# Patient Record
Sex: FEMALE | Race: OTHER | Employment: UNEMPLOYED | URBAN - METROPOLITAN AREA
[De-identification: names, ages, dates, MRNs, and addresses within clinical notes are randomized per-mention and may not be internally consistent; named-entity substitution may affect disease eponyms.]

---

## 2020-10-16 ENCOUNTER — TRANSCRIBE ORDER (OUTPATIENT)
Dept: FAMILY MEDICINE CLINIC | Age: 25
End: 2020-10-16

## 2020-10-16 ENCOUNTER — TELEPHONE (OUTPATIENT)
Dept: FAMILY MEDICINE CLINIC | Age: 25
End: 2020-10-16

## 2020-10-16 NOTE — TELEPHONE ENCOUNTER
Spoke with  who was given the [facility] office, ph  [phone]. She will keep the scheduled VV appt of this office and if does not need, will call to cancel.

## 2020-10-16 NOTE — TELEPHONE ENCOUNTER
n/a   Received: Yesterday   Message Contents   Stanislav Webb Front Office               Appointment not available     Caller's first and last name and relationship to patient (if not the patient): n/a       Best contact number: [phone]       Preferred date and time: as soon as possible       Scheduled appointment date and time: n/a       Reason for appointment: np est pcp; pregnacy test       Details to clarify the request: n/a       Laura Mendoza

## 2020-10-16 NOTE — TELEPHONE ENCOUNTER
----- Message from [place] sent at 10/15/2020  6:26 PM EDT -----  Regarding: /Telephone  General Message/Vendor Calls    Caller's first and last name:      Reason for call: Pt was schedule for an virtual appointment and she really needs a pregnancy test.       Callback required yes/no and why:Y       Best contact number(s): [phone]      Details to clarify the request:      Jarred Peña

## 2020-10-26 ENCOUNTER — VIRTUAL VISIT (OUTPATIENT)
Dept: FAMILY MEDICINE CLINIC | Age: 25
End: 2020-10-26

## 2020-10-26 ENCOUNTER — TELEPHONE (OUTPATIENT)
Dept: FAMILY MEDICINE CLINIC | Age: 25
End: 2020-10-26

## 2020-10-26 NOTE — PROGRESS NOTES
9:21 AM    Patient failed to check in. [address] Rosal[address] Sarah  assisted with this encounter  d/t language barrier. Called the number listed on chart. Voice mail is not set up, patient did not  the phone. Will route the note to clinic support staff to assist with rescheduling.      Silverio Powell MD

## 2020-11-02 ENCOUNTER — VIRTUAL VISIT (OUTPATIENT)
Dept: FAMILY MEDICINE CLINIC | Age: 25
End: 2020-11-02

## 2020-11-02 DIAGNOSIS — N92.6 MISSED MENSES: Primary | ICD-10-CM

## 2020-11-02 DIAGNOSIS — O21.9 NAUSEA AND VOMITING IN PREGNANCY: ICD-10-CM

## 2020-11-02 PROCEDURE — 99214 OFFICE O/P EST MOD 30 MIN: CPT | Performed by: STUDENT IN AN ORGANIZED HEALTH CARE EDUCATION/TRAINING PROGRAM

## 2020-11-02 PROCEDURE — 81025 URINE PREGNANCY TEST: CPT | Performed by: STUDENT IN AN ORGANIZED HEALTH CARE EDUCATION/TRAINING PROGRAM

## 2020-11-02 RX ORDER — PYRIDOXINE HCL (VITAMIN B6) 25 MG
25 TABLET ORAL
Qty: 90 TAB | Refills: 3 | Status: SHIPPED | OUTPATIENT
Start: 2020-11-02 | End: 2020-11-25

## 2020-11-02 RX ORDER — PRENATAL VIT 96/IRON FUM/FOLIC 27MG-0.8MG
1 TABLET ORAL DAILY
Qty: 90 TAB | Refills: 5 | Status: SHIPPED | OUTPATIENT
Start: 2020-11-02

## 2020-11-02 NOTE — PROGRESS NOTES
Alina Woodson  22 y.o. female  1995  [address] [facility] [place]:    Telemedicine Progress Note  Tony Love MD       Encounter Date and Time: 2020 at 3:18 PM    Consent:  She and/or the health care decision maker is aware that that she may receive a bill for this telephone service, depending on her insurance coverage, and has provided verbal consent to proceed: Yes     : [id number]    Chief Complaint   Patient presents with    Missed Menses     History of Present Illness   Alina Woodson is a 22 y.o. female was evaluated by synchronous (real-time) audio-video technology from home, through the [employer] Patient Portal.    Pt is a ; she reports missed menses (LMP: 20); regular monthly cycle, no contraceptives. She is currently at [address] (LEONID: 8w5d) based on her LMP. Pt took 3x +UPT at home. Surprise pregnancy but is pleased about it. Not taking any prenatals. Endorses nausea/vomiting ~3-4x/day. No vaginal bleeding. Review of Systems   Review of Systems   Constitutional: Negative for chills and fever. Respiratory: Negative for cough and shortness of breath. Cardiovascular: Negative for chest pain and palpitations. Gastrointestinal: Positive for nausea and vomiting. Negative for abdominal pain, constipation and diarrhea. Vitals/Objective:     General: alert, cooperative, no distress   Mental  status: mental status: alert, oriented to person, place, and time, normal mood, behavior, speech, dress, motor activity, and thought processes   Resp: resp: normal effort and no respiratory distress   Neuro: neuro: no gross deficits   Skin: skin: no discoloration or lesions of concern on visible areas   Due to this being a TeleHealth evaluation, many elements of the physical examination are unable to be assessed.       Assessment and Plan:   Time-based coding, delete if not needed: I spent at least 15 minutes with this established patient, and >50% of the time was spent counseling and/or coordinating care regarding missed menses    A/P:  1. Missed menses w/ N/V  8w5d by LMP (9/2/20); LEONID 6/9/21  - prenatal UILO97/ZGUP fum/folic (prenatal vitamin) 27 mg iron- 800 mcg tab tablet; Take 1 Tab by mouth daily. Dispense: 90 Tab; Refill: 5  - pyridoxine, vitamin B6, (VITAMIN B-6) 25 mg tablet; Take 1 Tab by mouth every six (6) hours as needed for Nausea. Dispense: 90 Tab; Refill: 3  - doxylamine succinate (UNISOM) 25 mg tablet; Take 1 Tab by mouth every six (6) hours as needed for Nausea or Vomiting. Dispense: 90 Tab; Refill: 3  - AMB POC URINE PREGNANCY TEST, VISUAL COLOR COMPARISON  - IOB visit by 12w GA ~11/25/21      Time spent in direct conversation with the patient to include medical condition(s) discussed, assessment and treatment plan:       We discussed the expected course, resolution and complications of the diagnosis(es) in detail. Medication risks, benefits, costs, interactions, and alternatives were discussed as indicated. I advised her to contact the office if her condition worsens, changes or fails to improve as anticipated. She expressed understanding with the diagnosis(es) and plan. Patient understands that this encounter was a temporary measure, and the importance of further follow up and examination was emphasized. Patient verbalized understanding. Patient informed to follow up: Follow-up and Dispositions  ·   Return for Lab visit this week for UPT to confirm pregnancy. IOB visit ~11/25; COVID screen neg. Pt discussed w/ Dr. Anner Scheuermann, Family Medicine Attending    Electronically Signed: Lizet Priest MD, Family Medicine Resident    Providers location when delivering service (clinic, hospital, home): home    CPT Codes 77954-96884 for Established Patients may apply to this Telehealth Visit. POS code: 18.   Modifier GT      Pursuant to the emergency declaration under the [address] and the [address] authority and the Coronavirus Preparedness and [employer] Act, this Virtual  Visit was conducted, with patient's consent, to reduce the patient's risk of exposure to COVID-19 and provide continuity of care for an established patient. Services were provided through a video synchronous discussion virtually to substitute for in-person clinic visit. History   Patients past medical, surgical and family histories were reviewed and updated. Past Medical History:   Diagnosis Date    Essential hypertension     with pregnancy    Miscarriage 2013    D&C for missed ab     Past Surgical History:   Procedure Laterality Date    HX DILATION AND CURETTAGE  2013     No family history on file.   Social History     Socioeconomic History    Marital status: SINGLE     Spouse name: Not on file    Number of children: Not on file    Years of education: Not on file    Highest education level: Not on file   Occupational History    Not on file   Social Needs    Financial resource strain: Not on file    Food insecurity     Worry: Not on file     Inability: Not on file    Transportation needs     Medical: Not on file     Non-medical: Not on file   Tobacco Use    Smoking status: Never Smoker    Smokeless tobacco: Never Used   Substance and Sexual Activity    Alcohol use: No    Drug use: No    Sexual activity: Yes     Partners: Female   Lifestyle    Physical activity     Days per week: Not on file     Minutes per session: Not on file    Stress: Not on file   Relationships    Social connections     Talks on phone: Not on file     Gets together: Not on file     Attends Cheondoism service: Not on file     Active member of club or organization: Not on file     Attends meetings of clubs or organizations: Not on file     Relationship status: Not on file    Intimate partner violence     Fear of current or ex partner: Not on file     Emotionally abused: Not on file     Physically abused: Not on file     Forced sexual activity: Not on file   Other Topics Concern    Not on file   Social History Narrative    Not on file     Patient Active Problem List   Diagnosis Code    Obesity affecting pregnancy O99.210    Supervision of normal pregnancy Z34.90    UTI in pregnancy O23.40    Pregnancy Z34.90          Current Medications/Allergies   Medications and Allergies reviewed:    Current Outpatient Medications   Medication Sig Dispense Refill    prenatal IAXE62/HUKR fum/folic (prenatal vitamin) 27 mg iron- 800 mcg tab tablet Take 1 Tab by mouth daily. 90 Tab 5    pyridoxine, vitamin B6, (VITAMIN B-6) 25 mg tablet Take 1 Tab by mouth every six (6) hours as needed for Nausea. 90 Tab 3    doxylamine succinate (UNISOM) 25 mg tablet Take 1 Tab by mouth every six (6) hours as needed for Nausea or Vomiting. 90 Tab 3    ibuprofen (MOTRIN) 800 mg tablet Take 1 Tab by mouth every eight (8) hours as needed for Pain. Take 3x a day for first 3 days then as needed thereafter for pain but no more than 3x a day.  50 Tab 0     No Known Allergies

## 2020-11-04 ENCOUNTER — LAB ONLY (OUTPATIENT)
Dept: FAMILY MEDICINE CLINIC | Age: 25
End: 2020-11-04

## 2020-11-04 LAB
HCG URINE, QL. (POC): POSITIVE
VALID INTERNAL CONTROL?: YES

## 2020-11-06 NOTE — PROGRESS NOTES
Called patient with [facility]  and informed her of upcoming appointment. Patient verbalized understanding.
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
## 2020-11-25 ENCOUNTER — HOSPITAL ENCOUNTER (OUTPATIENT)
Dept: LAB | Age: 25
Discharge: HOME | End: 2020-11-25

## 2020-11-25 ENCOUNTER — INITIAL PRENATAL (OUTPATIENT)
Dept: FAMILY MEDICINE CLINIC | Age: 25
End: 2020-11-25

## 2020-11-25 VITALS
SYSTOLIC BLOOD PRESSURE: 119 MMHG | DIASTOLIC BLOOD PRESSURE: 83 MMHG | HEART RATE: 102 BPM | RESPIRATION RATE: 18 BRPM | BODY MASS INDEX: 29.23 KG/M2 | OXYGEN SATURATION: 99 % | HEIGHT: 59 IN | WEIGHT: 145 LBS | TEMPERATURE: 97 F

## 2020-11-25 DIAGNOSIS — Z3A.12 12 WEEKS GESTATION OF PREGNANCY: Primary | ICD-10-CM

## 2020-11-25 DIAGNOSIS — Z3A.12 12 WEEKS GESTATION OF PREGNANCY: ICD-10-CM

## 2020-11-25 DIAGNOSIS — O30.001 TWIN GESTATION IN FIRST TRIMESTER, UNSPECIFIED MULTIPLE GESTATION TYPE: ICD-10-CM

## 2020-11-25 DIAGNOSIS — Z87.59 HISTORY OF PRE-ECLAMPSIA: ICD-10-CM

## 2020-11-25 LAB
BILIRUB UR QL STRIP: NEGATIVE
BLOOD BANK CMNT PATIENT-IMP: NORMAL
BLOOD GROUP ANTIBODIES SERPL: NORMAL
GLUCOSE UR-MCNC: NEGATIVE MG/DL
KETONES P FAST UR STRIP-MCNC: NORMAL MG/DL
PH UR STRIP: 6 [PH] (ref 4.6–8)
PROT UR QL STRIP: NORMAL
SP GR UR STRIP: 1.02 (ref 1–1.03)
UA UROBILINOGEN AMB POC: NORMAL (ref 0.2–1)
URINALYSIS CLARITY POC: CLEAR
URINALYSIS COLOR POC: NORMAL
URINE BLOOD POC: NORMAL
URINE LEUKOCYTES POC: NEGATIVE
URINE NITRITES POC: NEGATIVE

## 2020-11-25 PROCEDURE — 88175 CYTOPATH C/V AUTO FLUID REDO: CPT

## 2020-11-25 PROCEDURE — 0502F SUBSEQUENT PRENATAL CARE: CPT | Performed by: STUDENT IN AN ORGANIZED HEALTH CARE EDUCATION/TRAINING PROGRAM

## 2020-11-25 PROCEDURE — 90686 IIV4 VACC NO PRSV 0.5 ML IM: CPT | Performed by: STUDENT IN AN ORGANIZED HEALTH CARE EDUCATION/TRAINING PROGRAM

## 2020-11-25 PROCEDURE — 87624 HPV HI-RISK TYP POOLED RSLT: CPT

## 2020-11-25 PROCEDURE — 90471 IMMUNIZATION ADMIN: CPT | Performed by: STUDENT IN AN ORGANIZED HEALTH CARE EDUCATION/TRAINING PROGRAM

## 2020-11-25 NOTE — PROGRESS NOTES
Identified Patient with two Patient identifiers (Name and ). Two Patient Identifiers confirmed. Reviewed record in preparation for visit and have obtained necessary documentation. Chief Complaint   Patient presents with    Initial Prenatal Visit     LMP: 2020 LEONID: 2021 12w0d [id number]       Visit Vitals  /83 (BP 1 Location: Right arm, BP Patient Position: Sitting)   Pulse (!) 113   Temp 97 °F (36.1 °C) (Temporal)   Resp 18   Ht 4' 11\" (1.499 m)   Wt 145 lb (65.8 kg)   SpO2 99%   Breastfeeding No   BMI 29.29 kg/m²       1. Have you been to the ER, urgent care clinic since your last visit? Hospitalized since your last visit? No    2. Have you seen or consulted any other health care providers outside of the [address] [place] [address] since your last visit? Include any pap smears or colon screening.  No

## 2020-11-25 NOTE — PATIENT INSTRUCTIONS
Semanas 10 a 14 de martin embarazo: Instrucciones de cuidado Weeks 10 to 14 of Your Pregnancy: Care Instructions Instrucciones de cuidado Para las semanas 10 a 14 de martin embarazo, la placenta se ha formado dentro del útero. Es posible oír los latidos del corazón de martin bebé con un instrumento especial de ultrasonido. Los ojos de martin bebé pueden moverse y lo Luceni. Los brazos y las piernas se pueden flexionar. Nimisha es un buen momento para pensar en las pruebas para detectar defectos congénitos. Existen dos tipos de pruebas: de detección y de diagnóstico. Las pruebas de detección muestran la posibilidad de que un bebé tenga un determinado defecto congénito. No pueden decirle con seguridad que martin bebé tiene un problema. Las pruebas de diagnóstico muestran si un bebé tiene un determinado defecto congénito. Es martin decisión si desea hacerse estas pruebas. Usted y martin yessy pueden hablar con martin médico o partera sobre las pruebas de defectos congénitos. La atención de seguimiento es natalia parte clave de martin tratamiento y seguridad. Asegúrese de hacer y acudir a todas las citas, y llame a martin médico si está teniendo problemas. También es natalia buena idea saber los resultados de maryuri exámenes y mantener natalia lista de los medicamentos que abilio. Cómo puede cuidarse en el hogar? Joshua Tam · Puede hacerse pruebas de detección y pruebas de diagnóstico para detectar defectos congénitos. La decisión de [employer] prueba para detectar defectos congénitos es personal. Considere martin edad, martin probabilidad de transmitir natalia enfermedad hereditaria, martin necesidad de saber acerca de cualquier problema y lo que podría hacer después de tener los [place] de la prueba. ? Análisis de amara triple o cuádruple. Estas pruebas de detección se pueden hacer entre las semanas 15 y 21 del [facility]. [address] cantidades de juan o cuatro sustancias en la amara.  El médico observa estos resultados de la prueba, junto con martin edad y otros factores, para averiguar la probabilidad de que martin bebé pueda tener ciertos problemas. ? Amniocentesis. Esta prueba de diagnóstico se utiliza para detectar problemas cromosómicos en las células del bebé. Se puede hacer [employer] 15 y 21 del [facility], por lo general alrededor de la semana 16. 
? Prueba de translucencia nucal. Esta prueba [place] ultrasonido para medir el grosor de la garrett de la nuca del bebé. Un aumento en el grosor puede ser natalia señal temprana del síndrome de Down. 
? Muestra de vellosidades coriónicas ([facility], por maryuri siglas en inglés). Esta es natalia prueba que detecta determinados problemas genéticos del bebé. Los mismos genes que tiene martin bebé están en la placenta. Se extrae y se examina un pequeño pedazo de placenta. Esta prueba se realiza cuando usted [employer] semana 10 y 15 de martin embarazo. Alivie la falta de comodidad · Cálmese y duerma siestas cuando se sienta cansada. · Si maryuri emociones varían, hable con alguien. El llanto, la ansiedad y los problemas de concentración son comunes. · Si le sangran las encías, pruebe usar un cepillo de dientes más [place]. Si tiene las [employer] o estas le sangran mucho, consulte con martin dentista. · Si tiene mareos: 
? Levántese despacio después de estar sentada o acostada. ? Iliana abundantes líquidos. ? Coma pequeños refrigerios para mantener estable el azúcar en amara. ? Coloque la Chung Vaishali maryuri piernas shakira si estuviera atándose los cordones (agujetas). ? Acuéstese con las piernas más arriba que martin grey. Use almohadas para apoyar los pies. · Si tiene dolor de grey: ? Acuéstese. ? Pídale a martin yessy o a un amigo que le keaton un masaje en el fabricio. ? Colóquese paños fríos sobre la frente o en la nuca. ? Use acetaminofén (Tylenol) para aliviar el dolor.  No tome antiinflamatorios no esteroideos (KEN), tales shakira ibuprofeno (Advil, Motrin) o naproxeno (Aleve), a menos que martin médico le diga que puede [place]. · Si le sangra la Dorette Avers, apriétesela con suavidad y manténgala apretada por un rato. Para evitar sangrados nasales, pruebe hacerse masajes en las fosas nasales con natalia cantidad pequeña de vaselina. · Si tiene la nariz congestionada, pruebe con un aerosol nasal salino (agua salada). No utilice aerosoles descongestionantes. Cuídese los senos · Use un sostén que le dé buen soporte. · Sepa que los cambios en los senos son normales. ? Rubina senos pueden aumentar de [employer] y [employer]. El aumento de la sensibilidad suele mejorar a las 12 semanas. ? Rubina pezones pueden oscurecerse y agrandarse, y es posible que las pequeñas protuberancias (abultamientos) alrededor de ellos annabelle más visibles. ? Las venas del pecho y de los senos podrían ser Donnise Usha. · No se preocupe por endurecer los pezones. El amamantamiento hará esto naturalmente. Dónde puede encontrar más información en inglés? Sherinia Campbell a http://www.gray.com/ Nicol X153 en la búsqueda para aprender más acerca de \"Semanas 10 a 14 de martin embarazo: Instrucciones de cuidado. \" Revisado: 11 febrero, 2020               Versión del contenido: 12.6 © 3076-5046 Healthwise, Incorporated. Las instrucciones de cuidado fueron adaptadas bajo licencia por [employer] (which disclaims liability or warranty for this information). Si usted tiene [place] afección médica o sobre estas instrucciones, siempre pregunte a matrin profesional de hanna. [employer] niega toda garantía o responsabilidad por martin uso de esta información.

## 2020-11-25 NOTE — PROGRESS NOTES
I reviewed with the resident the medical history and the resident's findings on the physical examination. I discussed with the resident the patient's diagnosis and concur with the plan. 26yo  @ 12w0d by LMP c/w 11 wk scan   1. Diamniotic twin IUP: did not see two placentas so will ask MFM to confirm chorionicity, RH pos, declined NIPT, s/p flu   2. VB: started on , has been on and off, described as \"light\", seen at [facility] where they did [address] and confirmed viable twin IUP, precautions given   3.   Hx PreE: will start ASA     Referred to [facility]

## 2020-11-27 LAB
BACTERIA SPEC CULT: NORMAL
SERVICE CMNT-IMP: NORMAL

## 2020-11-27 NOTE — PROGRESS NOTES
Antibody screen- neg, Rb immune, HBsAg titer- neg, HIV- neg, UCX NG, CBC hgb 12.3 & plt 292    TPAL, Chlamydia/Gonorrhea, VZ, PAP pending     O+ and Hgb frac wnl during previous pregnancies

## 2020-11-28 LAB — VZV IGG SER IA-ACNC: 2348 INDEX

## 2020-11-30 LAB
C TRACH DNA SPEC QL NAA+PROBE: NEGATIVE
ERYTHROCYTE [DISTWIDTH] IN BLOOD BY AUTOMATED COUNT: 11.8 % (ref 11.5–14.5)
HBV SURFACE AG SER QL: <0.1 INDEX
HBV SURFACE AG SER QL: NEGATIVE
HCT VFR BLD AUTO: 37 % (ref 35–47)
HGB BLD-MCNC: 12.3 G/DL (ref 11.5–16)
HIV 1+2 AB+HIV1 P24 AG SERPL QL IA: NONREACTIVE
HIV12 RESULT COMMENT, HHIVC: NORMAL
MCH RBC QN AUTO: 30.4 PG (ref 26–34)
MCHC RBC AUTO-ENTMCNC: 33.2 G/DL (ref 30–36.5)
MCV RBC AUTO: 91.4 FL (ref 80–99)
N GONORRHOEA DNA SPEC QL NAA+PROBE: NEGATIVE
NRBC # BLD: 0 K/UL (ref 0–0.01)
NRBC BLD-RTO: 0 PER 100 WBC
PLATELET # BLD AUTO: 292 K/UL (ref 150–400)
PMV BLD AUTO: 11.6 FL (ref 8.9–12.9)
RBC # BLD AUTO: 4.05 M/UL (ref 3.8–5.2)
RUBV IGG SER-IMP: REACTIVE
RUBV IGG SERPL IA-ACNC: 131.6 IU/ML
SAMPLE TYPE: NORMAL
SERVICE CMNT-IMP: NORMAL
SPECIMEN SOURCE: NORMAL
T PALLIDUM AB SER QL IA: NON REACTIVE
WBC # BLD AUTO: 9.4 K/UL (ref 3.6–11)

## 2020-12-05 NOTE — PROGRESS NOTES
PAP: NILM, HPV was checked by mistake and is positive. There are no set guidelines as this is not a routine check so can recheck PAP in year.

## 2020-12-11 ENCOUNTER — APPOINTMENT (OUTPATIENT)
Dept: FAMILY MEDICINE CLINIC | Age: 25
End: 2020-12-11

## 2020-12-11 ENCOUNTER — HOSPITAL ENCOUNTER (OUTPATIENT)
Dept: PERINATAL CARE | Age: 25
Discharge: HOME | End: 2020-12-11
Attending: OBSTETRICS & GYNECOLOGY

## 2020-12-11 PROCEDURE — 76815 OB US LIMITED FETUS(S): CPT | Performed by: OBSTETRICS & GYNECOLOGY

## 2020-12-11 PROCEDURE — 99204 OFFICE O/P NEW MOD 45 MIN: CPT | Performed by: OBSTETRICS & GYNECOLOGY

## 2020-12-18 ENCOUNTER — ROUTINE PRENATAL (OUTPATIENT)
Dept: FAMILY MEDICINE CLINIC | Age: 25
End: 2020-12-18

## 2020-12-18 VITALS
RESPIRATION RATE: 16 BRPM | WEIGHT: 151 LBS | SYSTOLIC BLOOD PRESSURE: 116 MMHG | HEART RATE: 110 BPM | OXYGEN SATURATION: 99 % | DIASTOLIC BLOOD PRESSURE: 76 MMHG | BODY MASS INDEX: 30.5 KG/M2

## 2020-12-18 DIAGNOSIS — Z87.59 HISTORY OF PRE-ECLAMPSIA: ICD-10-CM

## 2020-12-18 DIAGNOSIS — O30.039 MONOCHORIONIC DIAMNIOTIC TWIN PREGNANCY, ANTEPARTUM: Primary | ICD-10-CM

## 2020-12-18 LAB
BILIRUB UR QL STRIP: NEGATIVE
GLUCOSE UR-MCNC: NEGATIVE MG/DL
KETONES P FAST UR STRIP-MCNC: NEGATIVE MG/DL
PH UR STRIP: 6 [PH] (ref 4.6–8)
PROT UR QL STRIP: NEGATIVE
SP GR UR STRIP: 1 (ref 1–1.03)
UA UROBILINOGEN AMB POC: NORMAL (ref 0.2–1)
URINALYSIS CLARITY POC: CLEAR
URINALYSIS COLOR POC: YELLOW
URINE BLOOD POC: NORMAL
URINE LEUKOCYTES POC: NORMAL
URINE NITRITES POC: NEGATIVE

## 2020-12-18 PROCEDURE — 0502F SUBSEQUENT PRENATAL CARE: CPT | Performed by: FAMILY MEDICINE

## 2020-12-18 NOTE — PROGRESS NOTES
I reviewed with the resident the medical history and the resident's findings on the physical examination. I discussed with the resident the patient's diagnosis and concur with the plan. VB on and off, most recently 5 days ago, color is brown. 26yo  @ 15w2d by LMP c/w 11 wk scan   1. MCDA twins: RH pos, NIPT pending, s/p flu. Early concern for TTTS, however difficulty obtaining accurate AC measurement due to omphalocele. Met with genetic counselor, has f/up scan in 2 weeks. Today we discussed MCDA, TTTS and the abnormalities seen on US in detail and all questions were answered. I informed patient that depending on f/up scan she may need to transfer care to [facility] and she understands this. If she does not transfer will plan to keep [facility] aware of her progress and refer if pt desires (but sounds like her preference would be to stay at our clinic if possible.)  2. Spotting: on and off, subchorionic hemorrhage seen on scan   3.   Hx PreE: ASA     Referred to [facility]   Give Care Card application at next visit

## 2020-12-30 ENCOUNTER — HOSPITAL ENCOUNTER (OUTPATIENT)
Dept: PERINATAL CARE | Age: 25
Discharge: HOME | End: 2020-12-30
Attending: OBSTETRICS & GYNECOLOGY

## 2020-12-30 PROCEDURE — 76810 OB US >/= 14 WKS ADDL FETUS: CPT | Performed by: OBSTETRICS & GYNECOLOGY

## 2020-12-30 PROCEDURE — 76805 OB US >/= 14 WKS SNGL FETUS: CPT | Performed by: OBSTETRICS & GYNECOLOGY

## 2021-01-15 ENCOUNTER — ROUTINE PRENATAL (OUTPATIENT)
Dept: FAMILY MEDICINE CLINIC | Age: 26
End: 2021-01-15

## 2021-01-15 ENCOUNTER — HOSPITAL ENCOUNTER (OUTPATIENT)
Dept: PERINATAL CARE | Age: 26
Discharge: HOME | End: 2021-01-15
Attending: OBSTETRICS & GYNECOLOGY

## 2021-01-15 VITALS
RESPIRATION RATE: 16 BRPM | BODY MASS INDEX: 31.71 KG/M2 | DIASTOLIC BLOOD PRESSURE: 72 MMHG | HEART RATE: 89 BPM | WEIGHT: 157 LBS | TEMPERATURE: 98 F | OXYGEN SATURATION: 98 % | SYSTOLIC BLOOD PRESSURE: 119 MMHG

## 2021-01-15 DIAGNOSIS — O30.039 MONOCHORIONIC DIAMNIOTIC TWIN PREGNANCY, ANTEPARTUM: Primary | ICD-10-CM

## 2021-01-15 DIAGNOSIS — Z87.59 HISTORY OF PRE-ECLAMPSIA: ICD-10-CM

## 2021-01-15 LAB
BILIRUB UR QL STRIP: NEGATIVE
GLUCOSE UR-MCNC: NEGATIVE MG/DL
KETONES P FAST UR STRIP-MCNC: NEGATIVE MG/DL
PH UR STRIP: 7 [PH] (ref 4.6–8)
PROT UR QL STRIP: NEGATIVE
SP GR UR STRIP: 1.01 (ref 1–1.03)
UA UROBILINOGEN AMB POC: NORMAL (ref 0.2–1)
URINALYSIS CLARITY POC: CLEAR
URINALYSIS COLOR POC: YELLOW
URINE BLOOD POC: NEGATIVE
URINE LEUKOCYTES POC: NEGATIVE
URINE NITRITES POC: NEGATIVE

## 2021-01-15 PROCEDURE — 0502F SUBSEQUENT PRENATAL CARE: CPT | Performed by: FAMILY MEDICINE

## 2021-01-15 PROCEDURE — 76815 OB US LIMITED FETUS(S): CPT | Performed by: OBSTETRICS & GYNECOLOGY

## 2021-01-15 NOTE — PROGRESS NOTES
Chief Complaint   Patient presents with    Routine Prenatal Visit       Patient identified with 2 patient identifiers (name and D. O. B)    Patient is a  at 19w2d    Leakage of Fluid: NO  Vaginal Bleeding: NO  Fetal Movement: YES  Prenatal vitamins: YES  Having Contractions: NO  Pain: NO    Visit Vitals  /72 (BP 1 Location: Left arm, BP Patient Position: Sitting)   Pulse 89   Temp 98 °F (36.7 °C) (Temporal)   Resp 16   Wt 157 lb (71.2 kg)   LMP 2020 (Exact Date)   SpO2 98%   BMI 31.71 kg/m²       Immunization History   Administered Date(s) Administered    Influenza Vaccine 2015    Influenza Vaccine Optasite) PF (>6 Mo Flulaval, Fluarix, and >3 Yrs [address] [place] [address], [place] [id number]) 2020    Tdap 10/05/2015

## 2021-01-15 NOTE — PROGRESS NOTES
I reviewed with the resident the medical history and the resident's findings on the physical examination. I discussed with the resident the patient's diagnosis and concur with the plan. 26yo  @ 15w2d by LMP c/w 11 wk scan   1. MCDA twins: RH pos, NIPT wnl, s/p flu. Transferring to [facility] due to MFM findings (see their scan)  2.   Hx PreE: ASA     Referred to [facility]